# Patient Record
Sex: FEMALE | Race: AMERICAN INDIAN OR ALASKA NATIVE
[De-identification: names, ages, dates, MRNs, and addresses within clinical notes are randomized per-mention and may not be internally consistent; named-entity substitution may affect disease eponyms.]

---

## 2020-03-25 ENCOUNTER — HOSPITAL ENCOUNTER (OUTPATIENT)
Dept: HOSPITAL 5 - SPVWC | Age: 70
Discharge: HOME | End: 2020-03-25
Attending: SURGERY
Payer: MEDICARE

## 2020-03-25 DIAGNOSIS — Z12.31: Primary | ICD-10-CM

## 2020-03-25 PROCEDURE — 77067 SCR MAMMO BI INCL CAD: CPT

## 2020-03-25 NOTE — MAMMOGRAPHY REPORT
DIGITAL SCREENING MAMMOGRAM WITH CAD, 3/25/2020



INDICATION: Routine screening mammography. 



TECHNIQUE:  Digital bilateral  2D mammography was obtained in the craniocaudal and mediolateral obliq
ue projections. This examination was interpreted with the benefit of Computer-Aided Detection analysi
s.



COMPARISON: None available. However, she indicated that she had a prior mammogram at [facility]
.



FINDINGS: 



Breast Density: The breasts are heterogeneously dense, which may obscure small masses.



There is no evidence of dominant mass, suspicious calcifications or architectural distortion in eithe
r breast.



IMPRESSION: No mammographic evidence of malignancy.





Follow up recommendation: Routine yearly



BI-RADS Category 1:  Negative.



A "normal" or negative report should not discourage follow up or biopsy of a clinically significant f
inding.



A written summary of these findings will be mailed to the patient. The patient will be entered into a
 mammography reporting system which will generate a reminder letter for the patient's next appointmen
t at the appropriate interval.



The [employer] College [employer] recommends yearly mammograms starting at age 40 and continuing as l
brayden as a woman is in good health.  Breast MRI is recommended for women with an approximate 20-25% or 
greater lifetime risk of breast cancer, including women with a strong family history of breast or ova
schuyler cancer or who have been treated for Hodgkin's disease.



Signer Name: Raúl Sylvester MD 

Signed: 3/25/2020 10:51 AM

Workstation Name: [id number]

## 2020-11-10 ENCOUNTER — HOSPITAL ENCOUNTER (OUTPATIENT)
Dept: HOSPITAL 5 - SPVIMAG | Age: 70
Discharge: HOME | End: 2020-11-10
Attending: SURGERY
Payer: MEDICARE

## 2020-11-10 DIAGNOSIS — N64.89: Primary | ICD-10-CM

## 2020-11-10 DIAGNOSIS — E04.1: ICD-10-CM

## 2020-11-10 DIAGNOSIS — Z90.11: ICD-10-CM

## 2020-11-10 DIAGNOSIS — Z85.3: ICD-10-CM

## 2020-11-10 PROCEDURE — 77049 MRI BREAST C-+ W/CAD BI: CPT

## 2020-11-10 PROCEDURE — A9577 INJ MULTIHANCE: HCPCS

## 2020-11-10 PROCEDURE — C8908 MRI W/O FOL W/CONT, BREAST,: HCPCS

## 2020-11-11 NOTE — MAGNETIC RESONANCE REPORT
Bilateral breast MR without and with contrast.



History: Personal history of right breast lumpectomy. Patient with strong family history of breast ca
ncer



Comparison: 3/25/2020. 



Technique: Multiplanar multisequence MR images of the breast were obtained before and after the intra
venous administration of intravenous contrast. Post processing analysis and review was performed on a
 separate computer workstation.



Findings: 



Breast composition is scattered fibroglandular. There is minimal background parenchymal enhancement b
ilaterally.



There is asymmetrically small appearance of the right breast which likely reflects patient's surgical
 excision following reported right breast cancer. No discrete enhancing mass, dominant focus, or othe
r abnormal enhancement is identified within either breast.



No abnormal axillary or internal mammary lymph nodes.



The left lobe of the thyroid is markedly enlarged and there is a suspected 3.5 x 3.2 cm left thyroid 
nodule.



Impression: 



No evidence of breast malignancy status post reported right breast lumpectomy. Patient will be due fo
r routine screening mammogram in March 2021.



The left lobe of the thyroid is markedly enlarged and there is a suspected 3.5 cm left thyroid nodule
. Recommend dedicated thyroid ultrasound for further evaluation.



BIRADS 2:  Benign



A normal MRI does not exclude the presence of some forms of breast malignancy as literature reports s
uggest that some forms of ductal carcinoma in situ or lobular carcinoma, particularly, may not be det
ected on MRI.



The sensitivity and specificity of MRI for cancers under 5 mm may be reduced.



MRI does not replace the recommendation for annual conventional mammographic evaluation and should be
 used as an adjunct to mammography and physical examination as necessary.  



Signer Name: William Diamond MD 

Signed: 11/11/2020 10:02 AM

Workstation Name: [id number]

## 2020-11-17 ENCOUNTER — HOSPITAL ENCOUNTER (OUTPATIENT)
Dept: HOSPITAL 5 - US | Age: 70
Discharge: HOME | End: 2020-11-17
Attending: SURGERY
Payer: MEDICARE

## 2020-11-17 DIAGNOSIS — E04.2: Primary | ICD-10-CM

## 2020-11-17 PROCEDURE — 76536 US EXAM OF HEAD AND NECK: CPT

## 2020-11-17 NOTE — ULTRASOUND REPORT
ULTRASOUND THYROID



INDICATION / CLINICAL INFORMATION: ABNORMAL MRI. 3.5 cm left thyroid nodule on recent MRI breast.



COMPARISON: MRI breast dated 11/10/20



FINDINGS:



RIGHT LOBE: Size (cm) = 4.5 x 1.5 x 1.7 

LEFT LOBE: Size (cm) = 5.8 x 3.3 x 3.4

ISTHMUS: Thickness (cm) = 0.2 

APPEARANCE: Normal.



SMALL NODULES < 1 cm: Several small hypoechoic/anechoic sub-centimeters nodules.



NODULES >= 1 cm or with SUSPICIOUS FEATURES:  

- NODULE # 1

  - Location: Left Mid 

  - Size (cm): 3.7 x 2.9 x 3.2 

     - Significant Change (>= 20% in 2 dim. & inc. >= 2mm): No significant change

  - Composition: Mixed cystic & solid = 1 point

  - Echogenicity: Hyperechoic or Isoechoic = 1 point

  - Shape: Wider-than-tall = 0 points

  - Margin: Smooth = 0 points

  - Echogenic Foci: None = 0 points

     - Additional Echogenic Foci: None = 0 points

  - Additional Findings: None.

  - ACR TI-RADS Score / Category = 2 points / TI-RADS 2  

 

 

- NODULE # 2

  - Location: Left Lower 

  - Size (cm): 1.9 x 1.5 x 1.8 

     - Significant Change (>= 20% in 2 dim. & inc. >= 2mm): No significant change

  - Composition: Solid = 2 points

  - Echogenicity: Hypoechoic = 2 points

  - Shape: Wider-than-tall = 0 points

  - Margin: Smooth = 0 points

  - Echogenic Foci: None = 0 points

     - Additional Echogenic Foci: None = 0 points

  - Additional Findings: None.

  - ACR TI-RADS Score / Category = 4 points / TI-RADS 4 



 

- NODULE # 3

  - Location: Right Mid 

  - Size (cm): 1.1 x 0.9 x 1.1 

     - Significant Change (>= 20% in 2 dim. & inc. >= 2mm): No significant change

  - Composition: Mixed cystic & solid = 1 point

  - Echogenicity: Hyperechoic or Isoechoic = 1 point

  - Shape: Wider-than-tall = 0 points

  - Margin: Smooth = 0 points

  - Echogenic Foci: None = 0 points

     - Additional Echogenic Foci: None = 0 points

  - Additional Findings: None.

  - ACR TI-RADS Score / Category = 2 points / TI-RADS 2  





LYMPH NODES: No abnormal lymph nodes.

PARATHYROID GLANDS: No abnormal parathyroid glands.

ADDITIONAL FINDINGS: None.



IMPRESSION:

1. Left Lower thyroid nodule is 1.9 cm and TI-RADS 4 (Moderately Suspicious). 

- Recommendation: If >=1.5 cm, then FNA. If 1.0-1.49 cm, then follow-up at 1, 2, 3, 5 years. If <1 cm
, then no follow-up. 

2. Left Mid thyroid nodule is 3.7 cm and TI-RADS 2 (Not Suspicious). 

- Recommendation: No FNA or follow-up.  

3. Right Mid thyroid nodule is 1.1 cm and TI-RADS 2 (Not Suspicious). 

- Recommendation: No FNA or follow-up. 









------------------------------------------------------------------------

ACR TI-RADS Thyroid Nodule Recommendations

------------------------------------------------------------------------

TI-RADS 1 (0 pts) -- Benign. No Fine Needle Aspirate biopsy (FNA) or follow-up.

TI-RADS 2 (1-2 pts) -- Not Suspicious. No FNA or follow-up.

TI-RADS 3 (3 pts) -- Mildly Suspicious. If >2.5 cm: FNA. If >1.5 cm: Follow up at 1, 3, 5 years.

TI-RADS 4 (4-6 pts) -- Moderately Suspicious. If >1.5 cm: FNA. If >1 cm: Follow up at 1, 2, 3, 5 year
s.

TI-RADS 5 (7+ pts) -- Highly Suspicious. If >1 cm: FNA. If > 0.5 cm: Follow annually for 5 years.

------------------------------------------------------------------------



REFERENCE: ACR Thyroid Imaging, Reporting and Data System (TI-RADS): White Paper of the ACR TI-RADS C
ommittee. J AM Garo Radiol 2017;14:587-595. 





NOTE: Nodules < 1 cm do not typically require follow-up or FNA unless there are suspicious features. 




NOTE: Nodule size maximum dimension determines whether a given lesion should be biopsied or followed.




NOTE: If multiple nodules meet criteria for FNA, only the two (2) most suspicious nodules should be b
iopsied. In addition, FNA of any suspicious cervical nodes should be biopsied.



NOTE: Predominantly Cystic nodules and Spongiform nodules, composed predominantly (>50%) of small cys
tic spaces, are considered benign (TI-RADS 1) regardless of other criteria. 



Signer Name: GEORGE Pardo MD 

Signed: 11/17/2020 10:22 AM

Workstation Name: [id number]

## 2020-12-02 ENCOUNTER — HOSPITAL ENCOUNTER (OUTPATIENT)
Dept: HOSPITAL 5 - US | Age: 70
Discharge: HOME | End: 2020-12-02
Attending: SURGERY
Payer: MEDICARE

## 2020-12-02 VITALS — DIASTOLIC BLOOD PRESSURE: 86 MMHG | SYSTOLIC BLOOD PRESSURE: 134 MMHG

## 2020-12-02 DIAGNOSIS — E78.00: ICD-10-CM

## 2020-12-02 DIAGNOSIS — M19.90: ICD-10-CM

## 2020-12-02 DIAGNOSIS — Z85.3: ICD-10-CM

## 2020-12-02 DIAGNOSIS — E04.1: ICD-10-CM

## 2020-12-02 DIAGNOSIS — Z90.721: ICD-10-CM

## 2020-12-02 DIAGNOSIS — Z98.890: ICD-10-CM

## 2020-12-02 DIAGNOSIS — Z86.010: ICD-10-CM

## 2020-12-02 DIAGNOSIS — Z79.899: ICD-10-CM

## 2020-12-02 DIAGNOSIS — Z90.49: ICD-10-CM

## 2020-12-02 DIAGNOSIS — E07.89: Primary | ICD-10-CM

## 2020-12-02 DIAGNOSIS — Z80.8: ICD-10-CM

## 2020-12-02 DIAGNOSIS — Z98.49: ICD-10-CM

## 2020-12-02 PROCEDURE — 60100 BIOPSY OF THYROID: CPT

## 2020-12-02 PROCEDURE — 76942 ECHO GUIDE FOR BIOPSY: CPT

## 2020-12-02 PROCEDURE — 88112 CYTOPATH CELL ENHANCE TECH: CPT

## 2020-12-02 PROCEDURE — 10005 FNA BX W/US GDN 1ST LES: CPT

## 2020-12-02 PROCEDURE — 88172 CYTP DX EVAL FNA 1ST EA SITE: CPT

## 2020-12-02 PROCEDURE — 88177 CYTP FNA EVAL EA ADDL: CPT

## 2020-12-02 PROCEDURE — 88173 CYTOPATH EVAL FNA REPORT: CPT

## 2020-12-02 NOTE — ULTRASOUND REPORT
Ultrasound biopsy thyroid



HISTORY: Abnormal thyroid ultrasound, left thyroid mass



COMPARISON: Thyroid ultrasound dated 11/17/2020



DESCRIPTION OF PROCEDURE: Informed consent was obtained. Sterile technique was utilized. 1% lidocaine
 for skin anesthesia. Using ultrasound guidance, 3 fine-needle aspirations and 2 Rotex biopsies were 
performed on the 1.9 cm solid nodule in the mid to inferior left thyroid lobe. The patient tolerated 
the procedure without difficulty. Pathology deemed the samples adequate for evaluation.



IMPRESSION: Successful ultrasound-guided FNA and biopsy of the left thyroid lesion as described.



Signer Name: Servando Fisher Jr, MD 

Signed: 12/2/2020 12:06 PM

Workstation Name: [id number]

## 2021-04-12 ENCOUNTER — HOSPITAL ENCOUNTER (OUTPATIENT)
Dept: HOSPITAL 5 - SPVWC | Age: 71
Discharge: HOME | End: 2021-04-12
Attending: SURGERY
Payer: MEDICARE

## 2021-04-12 DIAGNOSIS — Z12.31: Primary | ICD-10-CM

## 2021-04-12 PROCEDURE — 77067 SCR MAMMO BI INCL CAD: CPT

## 2021-04-12 PROCEDURE — 77063 BREAST TOMOSYNTHESIS BI: CPT

## 2021-04-12 NOTE — MAMMOGRAPHY REPORT
DIGITAL SCREENING MAMMOGRAM WITH CAD, 4/12/2021



CLINICAL INFORMATION / INDICATION: Routine screening mammography. SCREENING MAMMO WITH PHILIP



TECHNIQUE:  Digital bilateral 2D mammography was obtained in the craniocaudal and mediolateral obliqu
e projections. This examination was interpreted with the benefit of Computer-Aided Detection analysis
.



COMPARISON: 3/25/2020



FINDINGS: 



Breast Density: There are scattered areas of fibroglandular density.



No dominant mass, suspicious calcifications, or architectural distortion in either breast. 



Postlumpectomy changes are noted on the right.

 

IMPRESSION: No mammographic evidence of malignancy.



Follow up recommendation: Routine yearly



BI-RADS Category 2:  Benign.





-------------------------------------------------------------------------------------------

A "normal" or negative report should not discourage follow up or biopsy of a clinically significant f
inding.



A written summary of these findings will be mailed to the patient. The patient will be entered into a
 mammography reporting system which will generate a reminder letter for the patient's next appointmen
t at the appropriate interval.



The American College of Radiology recommends yearly mammograms starting at age 40 and continuing as l
brayden as a woman is in good health.  Breast MRI is recommended for women with an approximate 20-25% or 
greater lifetime risk of breast cancer, including women with a strong family history of breast or ova
schuyler cancer or who have been treated for Hodgkin's disease.



Signer Name: Axel Cadena MD 

Signed: 4/12/2021 10:17 AM

Workstation Name: [email or web address]

## 2021-04-12 NOTE — MAMMOGRAPHY REPORT
DIGITAL SCREENING MAMMOGRAM WITH CAD, 4/12/2021



CLINICAL INFORMATION / INDICATION: Routine screening mammography. SCREENING MAMMO WITH PHILIP



TECHNIQUE:  Digital bilateral 2D mammography was obtained in the craniocaudal and mediolateral obliqu
e projections. This examination was interpreted with the benefit of Computer-Aided Detection analysis
.



COMPARISON: 3/25/2020



FINDINGS: 



Breast Density: There are scattered areas of fibroglandular density.



No dominant mass, suspicious calcifications, or architectural distortion in either breast. 



Postlumpectomy changes are noted on the right.

 

IMPRESSION: No mammographic evidence of malignancy.



Follow up recommendation: Routine yearly



BI-RADS Category 2:  Benign.





-------------------------------------------------------------------------------------------

A "normal" or negative report should not discourage follow up or biopsy of a clinically significant f
inding.



A written summary of these findings will be mailed to the patient. The patient will be entered into a
 mammography reporting system which will generate a reminder letter for the patient's next appointmen
t at the appropriate interval.



The American [employer] of [employer] recommends yearly mammograms starting at age 40 and continuing as l
brayden as a woman is in good health.  Breast MRI is recommended for women with an approximate 20-25% or 
greater lifetime risk of breast cancer, including women with a strong family history of breast or ova
schuyler cancer or who have been treated for Hodgkin's disease.



Signer Name: Axel Cadena MD 

Signed: 4/12/2021 10:17 AM

Workstation Name: [email or web address]